# Patient Record
Sex: MALE | Race: WHITE | NOT HISPANIC OR LATINO | Employment: UNEMPLOYED | URBAN - METROPOLITAN AREA
[De-identification: names, ages, dates, MRNs, and addresses within clinical notes are randomized per-mention and may not be internally consistent; named-entity substitution may affect disease eponyms.]

---

## 2020-07-07 ENCOUNTER — HOSPITAL ENCOUNTER (EMERGENCY)
Facility: HOSPITAL | Age: 55
Discharge: HOME | End: 2020-07-07
Attending: EMERGENCY MEDICINE
Payer: MEDICAID

## 2020-07-07 VITALS
SYSTOLIC BLOOD PRESSURE: 112 MMHG | HEIGHT: 71 IN | OXYGEN SATURATION: 95 % | DIASTOLIC BLOOD PRESSURE: 68 MMHG | HEART RATE: 86 BPM | WEIGHT: 125 LBS | RESPIRATION RATE: 14 BRPM | TEMPERATURE: 98 F | BODY MASS INDEX: 17.5 KG/M2

## 2020-07-07 DIAGNOSIS — F10.920 ALCOHOLIC INTOXICATION WITHOUT COMPLICATION: Primary | ICD-10-CM

## 2020-07-07 PROCEDURE — 99283 EMERGENCY DEPT VISIT LOW MDM: CPT

## 2020-07-07 NOTE — ED PROVIDER NOTES
Encounter Date: 7/7/2020    SCRIBE #1 NOTE: I, Kristen Ga, am scribing for, and in the presence of,  Dr. Acevedo. I have scribed the entire note.       History     Chief Complaint   Patient presents with    Alcohol Intoxication     Pt presents via NO ems secondary to being found on the street intoxicated. Pt admits to etoh use. Pt has no complaints.       Time seen by provider: 2:34 AM on 07/07/2020    The patient is a 55 y.o. male who presents to the ED with complaint of alcohol intoxication which onset pta. Symptoms are constant in severity. Associated sxs include syncope. Patient denies any back pain, neck pain, and all other sxs at this time. No prior Tx included. Patient was said to have been found passed out in [place] by the police after alcohol use, and EMS was called. Patient denies any suicidal ideations. Patient mentions he has a shattered right knee. Patient is constantly asking what hospital he is in, and was unaware of how he was found and brought in.    No PMHx. No PSHx.    The history is provided by the patient and the EMS personnel.     Review of patient's allergies indicates:  No Known Allergies  No past medical history on file.  No past surgical history on file.  No family history on file.  Social History     Tobacco Use    Smoking status: Not on file   Substance Use Topics    Alcohol use: Not on file    Drug use: Not on file     Review of Systems   Constitutional: Negative for chills and fever.   HENT: Negative for sore throat.    Eyes: Negative for redness.   Respiratory: Negative for shortness of breath.    Cardiovascular: Negative for chest pain.   Gastrointestinal: Negative for abdominal pain, diarrhea, nausea and vomiting.   Genitourinary: Negative for dysuria and hematuria.   Musculoskeletal: Negative for back pain.   Skin: Negative for rash.   Neurological: Positive for syncope. Negative for headaches.   Psychiatric/Behavioral: Negative for suicidal ideas.       Physical Exam      Initial Vitals [07/07/20 0129]   BP Pulse Resp Temp SpO2   112/68 86 14 97.8 °F (36.6 °C) 95 %      MAP       --         Physical Exam    Nursing note and vitals reviewed.  Constitutional: He appears well-developed and well-nourished. He is not diaphoretic. No distress.   HENT:   Head: Normocephalic and atraumatic.   Mouth/Throat: Oropharynx is clear and moist.   Eyes: Conjunctivae and EOM are normal.   Neck: Normal range of motion. Neck supple.   Cardiovascular: Normal rate, regular rhythm and normal heart sounds.   Pulmonary/Chest: Breath sounds normal. No respiratory distress.   Abdominal: Soft. There is no abdominal tenderness.   Musculoskeletal: Normal range of motion. No tenderness or edema.   Neurological: He is alert and oriented to person, place, and time. He has normal strength.   Skin: Skin is warm and dry.         ED Course   Procedures  Labs Reviewed - No data to display       Imaging Results    None          Medical Decision Making:   History:   Old Medical Records: I decided to obtain old medical records.  Initial Assessment:   55-year-old male, history of alcohol abuse presents the ER for evaluation of alcohol intoxication.  Was found in the [facility], drinking, passed out.  EMS was called and transferred patient to the ER.  Arrived in the ER, clinically intoxicated but no acute distress, no signs of trauma.  Will wait till clinically sober will reassess.  Glucose per EMS was 127.  ED Management:  Awake, alert, clinically sober ambulating without difficulty answering questions appropriately, patient medically cleared to discharge                   ED Course as of Jul 07 0340   Tue Jul 07, 2020   0250 Resting in bed, no acute distress, patient is awake, oriented, able to converse and ambulate without difficulty.  Clinically sober.  Will discharge    [SE]      ED Course User Index  [SE] Ervin Acevedo MD                Clinical Impression:       ICD-10-CM ICD-9-CM   1. Alcoholic  intoxication without complication  F10.920 305.00         Disposition:   Disposition: Discharged  Condition: Stable     ED Disposition Condition    Discharge Stable        ED Prescriptions     None        Follow-up Information     Follow up With Specialties Details Why Contact Info Additional Information    [facility] Call   [address], [address]  [facility] [address] [phone] At this time [facility] will only use these entries [facility], [facility], and Emergency Department due to COVID-19 precautions.                       My Scribe Attestation: I acknowledge that the documentation on this chart was provided by described on the date of service noted above and that the documentation in the chart accurately reflects work and decisions made by me alone.                 Ervin Acevedo MD  07/07/20 0340       Ervin Acevedo MD  07/07/20 0340

## 2020-07-07 NOTE — ED NOTES
Pt awake, alert, oriented to person only.  Smells of ETOH.  Cursing with staff.  Resp even and unlabored.  Skin pink, warm, dry.